# Patient Record
Sex: FEMALE | Race: BLACK OR AFRICAN AMERICAN | NOT HISPANIC OR LATINO | Employment: UNEMPLOYED | URBAN - METROPOLITAN AREA
[De-identification: names, ages, dates, MRNs, and addresses within clinical notes are randomized per-mention and may not be internally consistent; named-entity substitution may affect disease eponyms.]

---

## 2018-03-19 PROBLEM — F41.9 ANXIETY: Status: ACTIVE | Noted: 2018-03-19

## 2018-03-19 PROBLEM — I10 ESSENTIAL HYPERTENSION: Status: ACTIVE | Noted: 2018-03-19

## 2018-03-19 PROBLEM — E11.65 TYPE 2 DIABETES MELLITUS WITH HYPERGLYCEMIA: Status: ACTIVE | Noted: 2018-03-19

## 2018-03-19 PROBLEM — G47.33 OSA (OBSTRUCTIVE SLEEP APNEA): Status: ACTIVE | Noted: 2018-03-19

## 2018-03-19 PROBLEM — E03.9 HYPOTHYROIDISM: Status: ACTIVE | Noted: 2018-03-19

## 2018-03-19 PROBLEM — E66.9 OBESITY: Status: ACTIVE | Noted: 2018-03-19

## 2024-08-02 PROBLEM — S01.81XA FACIAL LACERATION: Status: ACTIVE | Noted: 2024-08-02

## 2024-08-13 ENCOUNTER — OFFICE VISIT (OUTPATIENT)
Dept: OTOLARYNGOLOGY | Facility: CLINIC | Age: 54
End: 2024-08-13
Payer: MEDICARE

## 2024-08-13 VITALS — BODY MASS INDEX: 29.09 KG/M2 | HEIGHT: 66 IN | WEIGHT: 181 LBS

## 2024-08-13 DIAGNOSIS — S01.81XD FACIAL LACERATION, SUBSEQUENT ENCOUNTER: Primary | ICD-10-CM

## 2024-08-13 PROCEDURE — 1159F MED LIST DOCD IN RCRD: CPT | Mod: CPTII,S$GLB,, | Performed by: STUDENT IN AN ORGANIZED HEALTH CARE EDUCATION/TRAINING PROGRAM

## 2024-08-13 PROCEDURE — 99999 PR PBB SHADOW E&M-EST. PATIENT-LVL III: CPT | Mod: PBBFAC,,, | Performed by: STUDENT IN AN ORGANIZED HEALTH CARE EDUCATION/TRAINING PROGRAM

## 2024-08-13 PROCEDURE — 99024 POSTOP FOLLOW-UP VISIT: CPT | Mod: S$GLB,,, | Performed by: STUDENT IN AN ORGANIZED HEALTH CARE EDUCATION/TRAINING PROGRAM

## 2024-08-13 NOTE — PROGRESS NOTES
ENT FOLLOW UP SUTURE PLACEMENT IN ER    Missed first follow up.   Has been tender at lip and in her mouth. Smells bad.   Has been smoking. Has been using peroxide to clean and vaseline.       Exam: sutures removed today, had some breakdown at wound edges, thicker than immediate repair, pink at laceration edges.   Intra-oral minor abrasion under upper lip    A/p:   Vaseline or Aquaphor at least TID, keep wound moist to heal.   Sun protection.   Recheck 3 weeks in case steroid needed  Do not smoke.   Avoid peroxide unless crusted.   If concern for this, will have her follow up with her dermatologist. [facility].    Marisol Morin MD

## 2024-09-12 ENCOUNTER — OFFICE VISIT (OUTPATIENT)
Dept: OTOLARYNGOLOGY | Facility: CLINIC | Age: 54
End: 2024-09-12
Payer: MEDICARE

## 2024-09-12 VITALS — BODY MASS INDEX: 30.82 KG/M2 | HEIGHT: 66 IN | WEIGHT: 191.81 LBS

## 2024-09-12 DIAGNOSIS — S01.81XD FACIAL LACERATION, SUBSEQUENT ENCOUNTER: Primary | ICD-10-CM

## 2024-09-12 PROCEDURE — 99999 PR PBB SHADOW E&M-EST. PATIENT-LVL IV: CPT | Mod: PBBFAC,,, | Performed by: STUDENT IN AN ORGANIZED HEALTH CARE EDUCATION/TRAINING PROGRAM

## 2024-09-12 PROCEDURE — 99212 OFFICE O/P EST SF 10 MIN: CPT | Mod: S$GLB,,, | Performed by: STUDENT IN AN ORGANIZED HEALTH CARE EDUCATION/TRAINING PROGRAM

## 2024-09-12 PROCEDURE — 1159F MED LIST DOCD IN RCRD: CPT | Mod: CPTII,S$GLB,, | Performed by: STUDENT IN AN ORGANIZED HEALTH CARE EDUCATION/TRAINING PROGRAM

## 2024-09-12 NOTE — PROGRESS NOTES
ENT FOLLOW UP SUTURE PLACEMENT IN ER    S: healed up, some concerns about appearance. Does not think scar is getting larger.       Exam: dark pigmented scar under nose with nodule where V flap of skin was which is also dark. Does not appear thicker than initial wound breakdown area. Only particularly visible concern is nodule.     A/p:   Healed well, is slightly thick and dark scar but had healed poorly at first post op.   Seeing derm next week, have injected steroids in keloid prior. I dicussed that I could inject steroids but derm probably does this more and is less likely to bleach her skin if they think she needs it. I would wait a few months before considering anything unless scar starts to expand. She will talk to derm.     Sun protection.   Do not smoke.     Marisol Morin MD

## 2024-09-12 NOTE — PATIENT INSTRUCTIONS
Wound had healed much better at second visit. Laceration repaired 8/2 with permanent sutures. Poor healing at first post op visit. Much better at second. Scar is currently somewhat dark and thickened but has not been very long. I would recommend waiting 6 months before making any decisions about scar management unless scar enlargement is noted or if dermatologist thinks steroids injection or dermabrasion, etc would have a role now. Continue sun protection to scar area. You can also use silicone gel or sheeting for scar improvement and use regularly for next 3 months.     Please call with any concerns.